# Patient Record
Sex: FEMALE | Race: WHITE | URBAN - METROPOLITAN AREA
[De-identification: names, ages, dates, MRNs, and addresses within clinical notes are randomized per-mention and may not be internally consistent; named-entity substitution may affect disease eponyms.]

---

## 2017-06-20 ENCOUNTER — IMPORTED ENCOUNTER (OUTPATIENT)
Dept: URBAN - METROPOLITAN AREA CLINIC 1 | Facility: CLINIC | Age: 62
End: 2017-06-20

## 2017-06-20 PROBLEM — H04.123: Noted: 2017-06-20

## 2017-06-20 PROBLEM — H01.001: Noted: 2017-06-20

## 2017-06-20 PROBLEM — H40.033: Noted: 2017-06-20

## 2017-06-20 PROBLEM — H25.813: Noted: 2017-06-20

## 2017-06-20 PROBLEM — H01.004: Noted: 2017-06-20

## 2017-06-20 PROCEDURE — 92015 DETERMINE REFRACTIVE STATE: CPT

## 2017-06-20 PROCEDURE — 92014 COMPRE OPH EXAM EST PT 1/>: CPT

## 2017-06-20 NOTE — PATIENT DISCUSSION
1.  Cataract OU: Observe for now without intervention. The patient was advised to contact us if any change or worsening of vision2. Dry Eyes OU -The use/continuation of artificial tears were recommended. 3.  Narrow Angles OU: Symptoms of angle closure. 4.  Blepharitis anterior type OU - Daily warm compresses and lid scrubs were recommended. MRX for glasses givenReturn for an appointment in 1 year 27 with Dr. Nabila Gallagher.

## 2018-11-03 ENCOUNTER — IMPORTED ENCOUNTER (OUTPATIENT)
Dept: URBAN - METROPOLITAN AREA CLINIC 1 | Facility: CLINIC | Age: 63
End: 2018-11-03

## 2018-11-03 PROBLEM — H01.001: Noted: 2018-11-03

## 2018-11-03 PROBLEM — H01.004: Noted: 2018-11-03

## 2018-11-03 PROBLEM — H04.123: Noted: 2018-11-03

## 2018-11-03 PROBLEM — H01.005: Noted: 2018-11-03

## 2018-11-03 PROBLEM — H01.002: Noted: 2018-11-03

## 2018-11-03 PROBLEM — H25.813: Noted: 2018-11-03

## 2018-11-03 PROCEDURE — 92015 DETERMINE REFRACTIVE STATE: CPT

## 2018-11-03 PROCEDURE — 92014 COMPRE OPH EXAM EST PT 1/>: CPT

## 2019-09-12 ENCOUNTER — IMPORTED ENCOUNTER (OUTPATIENT)
Dept: URBAN - METROPOLITAN AREA CLINIC 1 | Facility: CLINIC | Age: 64
End: 2019-09-12

## 2019-09-12 NOTE — PATIENT DISCUSSION
1.  Glasses Check OU -- Recommend have patients glasses remade & try to adjust & reposition the progressive. Return as scheduled on 11/9/19 for 30 OU appointment with Dr. Viviane Kirby.

## 2019-11-15 ENCOUNTER — IMPORTED ENCOUNTER (OUTPATIENT)
Dept: URBAN - METROPOLITAN AREA CLINIC 1 | Facility: CLINIC | Age: 64
End: 2019-11-15

## 2019-11-15 PROBLEM — H40.033: Noted: 2019-11-15

## 2019-11-15 PROBLEM — H01.001: Noted: 2019-11-15

## 2019-11-15 PROBLEM — H53.001: Noted: 2019-11-15

## 2019-11-15 PROBLEM — H04.123: Noted: 2019-11-15

## 2019-11-15 PROBLEM — H01.004: Noted: 2019-11-15

## 2019-11-15 PROBLEM — H25.813: Noted: 2019-11-15

## 2019-11-15 PROBLEM — H16.143: Noted: 2019-11-15

## 2019-11-15 PROCEDURE — 92014 COMPRE OPH EXAM EST PT 1/>: CPT

## 2019-11-15 NOTE — PATIENT DISCUSSION
1.  Cataract OU: Observe for now without intervention. The patient was advised to contact us if any change or worsening of vision2. SILVERIO w/ PEK OU- Recommend PF ATs Q2H OU 3. Anterior Blepharitis OU - Daily Hot compresses and lid scrubs were recommended. 4. Narrow Angles OU 5. Amblyopia ODReturn for an appointment in tomorrow GC with Dr. Kary Mason.

## 2019-11-16 ENCOUNTER — IMPORTED ENCOUNTER (OUTPATIENT)
Dept: URBAN - METROPOLITAN AREA CLINIC 1 | Facility: CLINIC | Age: 64
End: 2019-11-16

## 2021-02-01 ENCOUNTER — IMPORTED ENCOUNTER (OUTPATIENT)
Dept: URBAN - METROPOLITAN AREA CLINIC 1 | Facility: CLINIC | Age: 66
End: 2021-02-01

## 2021-02-01 PROBLEM — H25.813: Noted: 2021-02-01

## 2021-02-01 PROBLEM — H53.001: Noted: 2021-02-01

## 2021-02-01 PROBLEM — H40.033: Noted: 2021-02-01

## 2021-02-01 PROBLEM — H04.123: Noted: 2021-02-01

## 2021-02-01 PROCEDURE — 92015 DETERMINE REFRACTIVE STATE: CPT

## 2021-02-01 PROCEDURE — 92014 COMPRE OPH EXAM EST PT 1/>: CPT

## 2021-02-01 NOTE — PATIENT DISCUSSION
1.  Cataract OU:  Visually Significant second to glare. Discussed the risks benefits alternatives and limitations of cataract surgery. The patient stated a full understanding and a desire to proceed with the procedure. The patient will need to return for preop appointment with cataract measurements and to have any additional questions answered and start pre-operative eye drops as directed. Phaco PCL OD then OS (Will defer Yag PI second to scheduling Phaco)Otherwise follow-up2. Narrow Angles OU Potentially Occludible. Will defer Yag PI second scheduling Phaco. 3.  Dry Eyes OU -The use/continuation of artificial tears were recommended. 4.  Amblyopia OD - Observe5. Return for an appointment for Phaco OD then OS with Dr. Claudeen Cobble.

## 2021-02-12 ENCOUNTER — IMPORTED ENCOUNTER (OUTPATIENT)
Dept: URBAN - METROPOLITAN AREA CLINIC 1 | Facility: CLINIC | Age: 66
End: 2021-02-12

## 2021-02-12 PROBLEM — H25.811: Noted: 2021-02-12

## 2021-02-12 PROCEDURE — 92136 OPHTHALMIC BIOMETRY: CPT

## 2021-02-12 NOTE — PATIENT DISCUSSION
1. Cataract OD:  Visually Significant-- discussed the risks benefits alternatives and limitations of cataract surgery. The patient stated a full understanding and a desire to proceed with the procedure. Discussed with patient if PO Gtts are more than $120 for all three combined when filling at their Pharmacy please call our office to request generic substitutions. Pt came to pre-op appointment today alone and Lifestyle Questionnaire Completed. Patient understands they will need glasses post-op to achieve their best corrected vision. *H/o Amblyopia OD. *H/o Narrow Angle OU.  Phaco PCL OD

## 2021-02-19 ENCOUNTER — IMPORTED ENCOUNTER (OUTPATIENT)
Dept: URBAN - METROPOLITAN AREA CLINIC 1 | Facility: CLINIC | Age: 66
End: 2021-02-19

## 2021-02-20 ENCOUNTER — IMPORTED ENCOUNTER (OUTPATIENT)
Dept: URBAN - METROPOLITAN AREA CLINIC 1 | Facility: CLINIC | Age: 66
End: 2021-02-20

## 2021-02-20 PROBLEM — Z96.1: Noted: 2021-02-20

## 2021-02-20 PROCEDURE — 99024 POSTOP FOLLOW-UP VISIT: CPT

## 2021-02-20 NOTE — PATIENT DISCUSSION
POD#1 CE/IOL OD (Standard) -- doing well. Use Flarex BID OD Bromsite Qdaily OD Ofloxacin TID OD. Use all three gtts through completion of PO gtt chart regimen/ Per our instructions given to patient.   Post op Warnings Reiterated RTC as scheduled

## 2021-02-22 ENCOUNTER — IMPORTED ENCOUNTER (OUTPATIENT)
Dept: URBAN - METROPOLITAN AREA CLINIC 1 | Facility: CLINIC | Age: 66
End: 2021-02-22

## 2021-02-22 PROBLEM — Z96.1: Noted: 2021-02-22

## 2021-02-22 PROBLEM — H25.812: Noted: 2021-02-22

## 2021-02-22 PROCEDURE — 92136 OPHTHALMIC BIOMETRY: CPT

## 2021-02-22 NOTE — PATIENT DISCUSSION
1.  Cataract OS Visually Significant secondary to glare discussed the risks benefits alternatives and limitations of cataract surgery. The patient stated a full understanding and a desire to proceed with the procedure. Discussed with patient if PO Gtts are more than $120 for all three combined when filling at their Pharmacy please call our office to request generic substitutions. Pt understands they will need glasses post-op to achieve their best corrected vision. Phaco PCL OS 2. POD#3  CE/IOL OD (Standard) doing well.   *H/o Amblyopia Falrex BID OD Bromsite Qdaily OD Ocuflox TID OD F/u as scheduled 2nd eye

## 2021-02-22 NOTE — PATIENT DISCUSSION
POD#3 CE/IOL OD (Standard) doing well. *Amblyopia OD Use Flarex BID OD Bromsite Qdaily OD Ocuflox TID OD : Use all three gtts through completion of PO gtt chart regimen/ Per our instructions given to patient.   Post op Warnings Reiterated RTC as scheduled

## 2021-03-03 ENCOUNTER — IMPORTED ENCOUNTER (OUTPATIENT)
Dept: URBAN - METROPOLITAN AREA CLINIC 1 | Facility: CLINIC | Age: 66
End: 2021-03-03

## 2021-03-04 ENCOUNTER — IMPORTED ENCOUNTER (OUTPATIENT)
Dept: URBAN - METROPOLITAN AREA CLINIC 1 | Facility: CLINIC | Age: 66
End: 2021-03-04

## 2021-03-04 PROBLEM — Z96.1: Noted: 2021-03-04

## 2021-03-04 PROCEDURE — 99024 POSTOP FOLLOW-UP VISIT: CPT

## 2021-03-04 NOTE — PATIENT DISCUSSION
1. POD#1 Phaco/ PCL OS (Standard w/ LenSx) - doing well. Use Flarex BID OS Bromsite Qdaily OS Ofloxacin TID OS : Use all three gtts through completion of PO gtt chart regimen/ Per our instructions given. Post op Warnings Reiterated 2. POW#3 Phaco/ PCL OD (Standard) - doing well. *H/o Amblyopia OD Use Flarex BID OD and Bromsite Qdaily OD: Use gtts through completion of PO gtt regimen.  RTC as scheduled

## 2021-03-29 ENCOUNTER — IMPORTED ENCOUNTER (OUTPATIENT)
Dept: URBAN - METROPOLITAN AREA CLINIC 1 | Facility: CLINIC | Age: 66
End: 2021-03-29

## 2021-03-29 PROBLEM — Z09: Noted: 2021-03-29

## 2021-03-29 PROCEDURE — 99024 POSTOP FOLLOW-UP VISIT: CPT

## 2021-03-29 NOTE — PATIENT DISCUSSION
POW#3 Phaco/ PCL OU (Standard OD Standard w/ LenSx OS) - doing well. *H/o Amblyopia ODFinish PO meds per PO gtt schedule MRX for glasses givenReturn for an appointment in February for a 27 with Dr. Giovanni Rodriguez.

## 2022-04-02 ASSESSMENT — VISUAL ACUITY
OS_GLARE: 20/100
OS_SC: 20/30
OD_SC: 20/40
OS_SC: 20/20
OS_GLARE: 20/100
OD_CC: J1+
OD_GLARE: 20/400
OD_SC: 20/40
OD_PH: SC 20/40
OS_CC: J1+
OD_GLARE: 20/100
OD_GLARE: 20/100
OD_SC: 20/40
OS_GLARE: 20/100
OS_CC: J1+
OD_SC: 20/60
OS_SC: 20/20
OD_CC: 20/60
OS_SC: 20/20
OD_CC: 20/40
OD_CC: 20/50
OD_CC: 20/400
OD_SC: 20/40
OD_CC: J2
OS_GLARE: 20/200
OS_CC: J1+
OD_GLARE: 20/100
OS_CC: 20/20
OS_GLARE: 20/100
OS_CC: J1+
OD_CC: J1+
OD_GLARE: 20/100
OS_CC: 20/20
OS_CC: 20/50
OS_SC: 20/20
OD_CC: J2
OD_CC: 20/40

## 2022-04-02 ASSESSMENT — TONOMETRY
OD_IOP_MMHG: 15
OD_IOP_MMHG: 18
OS_IOP_MMHG: 16
OD_IOP_MMHG: 15
OD_IOP_MMHG: 15
OS_IOP_MMHG: 16
OD_IOP_MMHG: 16
OS_IOP_MMHG: 16
OS_IOP_MMHG: 15
OD_IOP_MMHG: 15
OD_IOP_MMHG: 15
OD_IOP_MMHG: 16
OD_IOP_MMHG: 13
OS_IOP_MMHG: 16

## 2022-04-11 ENCOUNTER — COMPREHENSIVE EXAM (OUTPATIENT)
Dept: URBAN - METROPOLITAN AREA CLINIC 1 | Facility: CLINIC | Age: 67
End: 2022-04-11

## 2022-04-11 DIAGNOSIS — Z96.1: ICD-10-CM

## 2022-04-11 DIAGNOSIS — H04.123: ICD-10-CM

## 2022-04-11 DIAGNOSIS — H53.021: ICD-10-CM

## 2022-04-11 DIAGNOSIS — H01.021: ICD-10-CM

## 2022-04-11 DIAGNOSIS — H01.024: ICD-10-CM

## 2022-04-11 DIAGNOSIS — H43.813: ICD-10-CM

## 2022-04-11 PROCEDURE — 92015 DETERMINE REFRACTIVE STATE: CPT

## 2022-04-11 PROCEDURE — 92014 COMPRE OPH EXAM EST PT 1/>: CPT

## 2022-04-11 ASSESSMENT — KERATOMETRY
OS_AXISANGLE_DEGREES: 005
OS_AXISANGLE2_DEGREES: 95
OD_AXISANGLE2_DEGREES: 92
OD_AXISANGLE_DEGREES: 002
OS_K2POWER_DIOPTERS: 43.25
OD_K1POWER_DIOPTERS: 42.25
OD_K2POWER_DIOPTERS: 43.25
OS_K1POWER_DIOPTERS: 42.25

## 2022-04-11 ASSESSMENT — TONOMETRY
OS_IOP_MMHG: 15
OD_IOP_MMHG: 15

## 2022-04-11 ASSESSMENT — VISUAL ACUITY
OD_SC: 20/40-2
OS_SC: 20/20

## 2022-09-26 ENCOUNTER — EMERGENCY VISIT (OUTPATIENT)
Dept: URBAN - METROPOLITAN AREA CLINIC 1 | Facility: CLINIC | Age: 67
End: 2022-09-26

## 2022-09-26 DIAGNOSIS — H01.024: ICD-10-CM

## 2022-09-26 DIAGNOSIS — H10.021: ICD-10-CM

## 2022-09-26 DIAGNOSIS — H01.021: ICD-10-CM

## 2022-09-26 PROCEDURE — 92012 INTRM OPH EXAM EST PATIENT: CPT

## 2022-09-26 ASSESSMENT — KERATOMETRY
OS_AXISANGLE2_DEGREES: 95
OD_AXISANGLE_DEGREES: 002
OS_AXISANGLE_DEGREES: 005
OD_K1POWER_DIOPTERS: 42.25
OD_K2POWER_DIOPTERS: 43.25
OD_AXISANGLE2_DEGREES: 92
OS_K2POWER_DIOPTERS: 43.25
OS_K1POWER_DIOPTERS: 42.25

## 2022-09-26 ASSESSMENT — VISUAL ACUITY
OD_SC: 20/40-1
OS_SC: 20/20

## 2022-09-26 ASSESSMENT — TONOMETRY
OS_IOP_MMHG: 12
OD_IOP_MMHG: 10

## 2022-09-26 NOTE — PATIENT DISCUSSION
The condition was discussed with the patient. Cool compresses recommended.  Patient to start Tobradex QID(erx'd) OD only Then decrease to BID OD Only for 3-4 Days. Patient can use in OS if Symptoms start occurring. Patient does not need to be seen back unless symptoms do not improve Per RBF.

## 2023-04-13 ENCOUNTER — COMPREHENSIVE EXAM (OUTPATIENT)
Dept: URBAN - METROPOLITAN AREA CLINIC 1 | Facility: CLINIC | Age: 68
End: 2023-04-13

## 2023-04-13 DIAGNOSIS — H01.021: ICD-10-CM

## 2023-04-13 DIAGNOSIS — H26.493: ICD-10-CM

## 2023-04-13 DIAGNOSIS — H04.123: ICD-10-CM

## 2023-04-13 DIAGNOSIS — H01.024: ICD-10-CM

## 2023-04-13 DIAGNOSIS — H53.021: ICD-10-CM

## 2023-04-13 DIAGNOSIS — Z96.1: ICD-10-CM

## 2023-04-13 DIAGNOSIS — H35.373: ICD-10-CM

## 2023-04-13 DIAGNOSIS — H43.813: ICD-10-CM

## 2023-04-13 PROCEDURE — 92014 COMPRE OPH EXAM EST PT 1/>: CPT

## 2023-04-13 ASSESSMENT — KERATOMETRY
OS_K2POWER_DIOPTERS: 43.25
OS_AXISANGLE_DEGREES: 005
OD_AXISANGLE2_DEGREES: 92
OD_K1POWER_DIOPTERS: 42.25
OD_K2POWER_DIOPTERS: 43.25
OS_AXISANGLE2_DEGREES: 95
OS_K1POWER_DIOPTERS: 42.25
OD_AXISANGLE_DEGREES: 002

## 2023-04-13 ASSESSMENT — VISUAL ACUITY
OS_CC: 20/20
OS_BAT: 20/50
OD_CC: 20/30-2
OD_BAT: 20/50

## 2023-04-13 ASSESSMENT — TONOMETRY
OS_IOP_MMHG: 14
OD_IOP_MMHG: 14

## 2023-05-05 ENCOUNTER — CLINIC PROCEDURE ONLY (OUTPATIENT)
Dept: URBAN - METROPOLITAN AREA CLINIC 1 | Facility: CLINIC | Age: 68
End: 2023-05-05

## 2023-05-05 DIAGNOSIS — Z96.1: ICD-10-CM

## 2023-05-05 DIAGNOSIS — H26.493: ICD-10-CM

## 2023-05-05 PROCEDURE — 66821 AFTER CATARACT LASER SURGERY: CPT

## 2023-05-05 ASSESSMENT — KERATOMETRY
OS_K2POWER_DIOPTERS: 43.25
OS_AXISANGLE2_DEGREES: 95
OS_K1POWER_DIOPTERS: 42.25
OD_K1POWER_DIOPTERS: 42.25
OS_AXISANGLE_DEGREES: 005
OD_AXISANGLE2_DEGREES: 92
OD_AXISANGLE_DEGREES: 002
OD_K2POWER_DIOPTERS: 43.25

## 2023-05-12 RX ORDER — BUPROPION HYDROCHLORIDE 150 MG/1
150 TABLET, EXTENDED RELEASE ORAL DAILY
COMMUNITY

## 2023-05-12 RX ORDER — GABAPENTIN 800 MG/1
TABLET ORAL EVERY EVENING
COMMUNITY

## 2023-05-12 RX ORDER — ASPIRIN 81 MG/1
81 TABLET ORAL DAILY
COMMUNITY

## 2023-05-12 RX ORDER — CITALOPRAM 40 MG/1
40 TABLET ORAL NIGHTLY
COMMUNITY

## 2023-05-19 ENCOUNTER — CLINIC PROCEDURE ONLY (OUTPATIENT)
Dept: URBAN - METROPOLITAN AREA CLINIC 1 | Facility: CLINIC | Age: 68
End: 2023-05-19

## 2023-05-19 DIAGNOSIS — Z96.1: ICD-10-CM

## 2023-05-19 DIAGNOSIS — H26.492: ICD-10-CM

## 2023-05-19 PROCEDURE — 66821 AFTER CATARACT LASER SURGERY: CPT

## 2023-05-19 ASSESSMENT — KERATOMETRY
OD_AXISANGLE_DEGREES: 002
OD_K1POWER_DIOPTERS: 42.25
OS_AXISANGLE2_DEGREES: 95
OD_K2POWER_DIOPTERS: 43.25
OS_AXISANGLE_DEGREES: 005
OS_K2POWER_DIOPTERS: 43.25
OS_K1POWER_DIOPTERS: 42.25
OD_AXISANGLE2_DEGREES: 92

## 2023-06-26 ENCOUNTER — POST-OP (OUTPATIENT)
Dept: URBAN - METROPOLITAN AREA CLINIC 1 | Facility: CLINIC | Age: 68
End: 2023-06-26

## 2023-06-26 DIAGNOSIS — Z98.890: ICD-10-CM

## 2023-06-26 PROCEDURE — 99024 POSTOP FOLLOW-UP VISIT: CPT

## 2023-06-26 ASSESSMENT — TONOMETRY
OS_IOP_MMHG: 13
OD_IOP_MMHG: 13

## 2023-06-26 ASSESSMENT — VISUAL ACUITY
OD_CC: 20/30
OS_CC: 20/20

## 2023-06-26 ASSESSMENT — KERATOMETRY
OD_K1POWER_DIOPTERS: 42.25
OS_AXISANGLE_DEGREES: 005
OS_AXISANGLE2_DEGREES: 95
OS_K1POWER_DIOPTERS: 42.25
OD_AXISANGLE_DEGREES: 002
OS_K2POWER_DIOPTERS: 43.25
OD_K2POWER_DIOPTERS: 43.25
OD_AXISANGLE2_DEGREES: 92

## 2024-04-15 ENCOUNTER — COMPREHENSIVE EXAM (OUTPATIENT)
Dept: URBAN - METROPOLITAN AREA CLINIC 1 | Facility: CLINIC | Age: 69
End: 2024-04-15

## 2024-04-15 DIAGNOSIS — H01.024: ICD-10-CM

## 2024-04-15 DIAGNOSIS — H01.021: ICD-10-CM

## 2024-04-15 DIAGNOSIS — H04.123: ICD-10-CM

## 2024-04-15 DIAGNOSIS — D23.122: ICD-10-CM

## 2024-04-15 DIAGNOSIS — H35.373: ICD-10-CM

## 2024-04-15 PROCEDURE — 92014 COMPRE OPH EXAM EST PT 1/>: CPT

## 2024-04-15 ASSESSMENT — VISUAL ACUITY
OD_SC: 20/30
OS_SC: 20/20

## 2024-04-15 ASSESSMENT — TONOMETRY
OD_IOP_MMHG: 14
OS_IOP_MMHG: 14

## 2024-05-01 ENCOUNTER — SURGERY/PROCEDURE (OUTPATIENT)
Dept: URBAN - METROPOLITAN AREA SURGERY 1 | Facility: SURGERY | Age: 69
End: 2024-05-01

## 2024-05-01 DIAGNOSIS — D23.122: ICD-10-CM

## 2024-05-01 PROCEDURE — 67840 REMOVE EYELID LESION: CPT

## 2024-05-06 ENCOUNTER — EMERGENCY VISIT (OUTPATIENT)
Dept: URBAN - METROPOLITAN AREA CLINIC 1 | Facility: CLINIC | Age: 69
End: 2024-05-06

## 2024-05-06 DIAGNOSIS — Z98.890: ICD-10-CM

## 2024-05-06 PROCEDURE — 66999PO NON CO-MANAGED OTHER SURGERY PO

## 2024-05-06 ASSESSMENT — VISUAL ACUITY
OD_CC: J1+
OS_CC: 20/20
OD_CC: 20/30-1
OS_CC: J1+

## 2024-05-06 ASSESSMENT — TONOMETRY
OS_IOP_MMHG: 10
OD_IOP_MMHG: 10

## 2024-05-20 ENCOUNTER — POST-OP (OUTPATIENT)
Dept: URBAN - METROPOLITAN AREA CLINIC 1 | Facility: CLINIC | Age: 69
End: 2024-05-20

## 2024-05-20 DIAGNOSIS — Z98.890: ICD-10-CM

## 2024-05-20 PROCEDURE — 66999PO NON CO-MANAGED OTHER SURGERY PO

## 2024-05-20 ASSESSMENT — TONOMETRY
OD_IOP_MMHG: 10
OS_IOP_MMHG: 10

## 2024-05-20 ASSESSMENT — VISUAL ACUITY
OS_SC: 20/20
OD_SC: 20/20

## 2025-05-23 ENCOUNTER — COMPREHENSIVE EXAM (OUTPATIENT)
Age: 70
End: 2025-05-23

## 2025-05-23 DIAGNOSIS — H35.373: ICD-10-CM

## 2025-05-23 DIAGNOSIS — H01.02B: ICD-10-CM

## 2025-05-23 DIAGNOSIS — H04.123: ICD-10-CM

## 2025-05-23 DIAGNOSIS — H53.022: ICD-10-CM

## 2025-05-23 DIAGNOSIS — H01.02A: ICD-10-CM

## 2025-05-23 DIAGNOSIS — Z96.1: ICD-10-CM

## 2025-05-23 PROCEDURE — 92015 DETERMINE REFRACTIVE STATE: CPT

## 2025-05-23 PROCEDURE — 99214 OFFICE O/P EST MOD 30 MIN: CPT
